# Patient Record
(demographics unavailable — no encounter records)

---

## 2025-01-15 NOTE — PHYSICAL EXAM
[Right] : right foot and ankle [5___] : eversion 5[unfilled]/5 [] : no anterior ankle joint line tenderness [FreeTextEntry9] : She tolerates full range of motion mild discomfort throughout [de-identified] : She is able to go up on the right toes unassisted with pain

## 2025-01-15 NOTE — ASSESSMENT
[FreeTextEntry1] : - No gym or sports at this time -Discussed the role of ice compression and twice daily Motrin as needed for pain -Prescription for physical therapy is provided -We will get her into a lace up brace for walking at this time and then to be worn during sports as she returns -She will follow-up in 1 week [place] location

## 2025-01-15 NOTE — HISTORY OF PRESENT ILLNESS
[de-identified] : 01/15/25: She presents with her mom for orthopedic evaluation she is known to be active in soccer and gymnastics.  She states over the last week she rolled her right ankle at a tournament and then seem to exacerbated last night at practice.  She states pain is mostly laterally based and she has been ambulating with a limp.

## 2025-01-15 NOTE — IMAGING
[Right] : right ankle [There are no fractures, subluxations or dislocations. No significant abnormalities are seen] : There are no fractures, subluxations or dislocations. No significant abnormalities are seen [FreeTextEntry9] : Growth plates are open

## 2025-01-23 NOTE — HISTORY OF PRESENT ILLNESS
[de-identified] : 01/23/2025:  inversion injury about a week ago. went to [facility] and given brace. going to PT. now improving. no prior sig ankle probs. denies pmh. 3rd grade  [] : no [FreeTextEntry1] : right ankle [de-identified] : brace [de-identified] : [facility] [de-identified] : XR

## 2025-01-23 NOTE — DATA REVIEWED
[Outside X-rays] : outside x-rays [Right] : of the right [Ankle] : ankle [Foot] : foot [I reviewed the films/CD and additionally noted] : I reviewed the films/CD and additionally noted [FreeTextEntry1] : no acute fx

## 2025-01-23 NOTE — ASSESSMENT
[FreeTextEntry1] : wbat ice/elevate continue brace continue PT no gym/sports reeval 2 wks if not resolved

## 2025-02-25 NOTE — ASSESSMENT
[FreeTextEntry1] : wbat using otc brace gradual inc activity as [facility] nsaids prn f/up 1 month if not resolved

## 2025-02-25 NOTE — PHYSICAL EXAM
[Right] : right foot and ankle [NL (40)] : plantar flexion 40 degrees [NL 30)] : inversion 30 degrees [NL (20)] : eversion 20 degrees [5___] : eversion 5[unfilled]/5 [2+] : dorsalis pedis pulse: 2+ [] : no achilles tendon tenderness [FreeTextEntry8] : improved [TWNoteComboBox7] : dorsiflexion 15 degrees

## 2025-02-25 NOTE — HISTORY OF PRESENT ILLNESS
[de-identified] : 01/23/2025:  inversion injury about a week ago. went [facility] and given brace. going to PT. now improving. no prior sig ankle probs. denies pmh. 3rd grade   02/25/2025:  some improvement. was doing well and then had new injury about 1.5 wks ago playing soccer. using brace. going to PT. has returned to activity [] : no [FreeTextEntry1] : right ankle [de-identified] : brace [de-identified] : [facility] [de-identified] : XR